# Patient Record
(demographics unavailable — no encounter records)

---

## 2025-06-26 NOTE — HEALTH RISK ASSESSMENT
[Yes] : Yes [Monthly or less (1 pt)] : Monthly or less (1 point) [1 or 2 (0 pts)] : 1 or 2 (0 points) [Never (0 pts)] : Never (0 points) [0] : 2) Feeling down, depressed, or hopeless: Not at all (0) [Never] : Never [Audit-CScore] : 1 [EyeExamDate] : 00/2025

## 2025-06-26 NOTE — REVIEW OF SYSTEMS
[Fever] : no fever [Fatigue] : no fatigue [Discharge] : no discharge [Itching] : no itching [Earache] : no earache [Sore Throat] : no sore throat [Chest Pain] : no chest pain [Palpitations] : no palpitations [Shortness Of Breath] : no shortness of breath [Cough] : no cough [Abdominal Pain] : no abdominal pain [Constipation] : no constipation [Diarrhea] : no diarrhea [Dysuria] : no dysuria [Headache] : no headache

## 2025-06-26 NOTE — PLAN
[FreeTextEntry1] : 32-year-old male for new patient physical exam.  Labs as ordered.  Lifestyle modifications advised.  All questions answered.  Patient voiced understanding and in agreement to plan.  Return to clinic as recommended